# Patient Record
Sex: FEMALE | Race: WHITE | NOT HISPANIC OR LATINO | Employment: UNEMPLOYED | URBAN - METROPOLITAN AREA
[De-identification: names, ages, dates, MRNs, and addresses within clinical notes are randomized per-mention and may not be internally consistent; named-entity substitution may affect disease eponyms.]

---

## 2022-09-17 ENCOUNTER — APPOINTMENT (OUTPATIENT)
Dept: GENERAL RADIOLOGY | Facility: HOSPITAL | Age: 22
End: 2022-09-17

## 2022-09-17 PROCEDURE — 73560 X-RAY EXAM OF KNEE 1 OR 2: CPT | Performed by: FAMILY MEDICINE

## 2022-09-19 ENCOUNTER — OFFICE VISIT (OUTPATIENT)
Dept: ORTHOPEDIC SURGERY | Facility: CLINIC | Age: 22
End: 2022-09-19

## 2022-09-19 ENCOUNTER — TELEPHONE (OUTPATIENT)
Dept: ORTHOPEDIC SURGERY | Facility: CLINIC | Age: 22
End: 2022-09-19

## 2022-09-19 VITALS
SYSTOLIC BLOOD PRESSURE: 120 MMHG | DIASTOLIC BLOOD PRESSURE: 82 MMHG | WEIGHT: 110 LBS | BODY MASS INDEX: 17.68 KG/M2 | HEART RATE: 70 BPM | HEIGHT: 66 IN

## 2022-09-19 DIAGNOSIS — M25.461 EFFUSION OF RIGHT KNEE: ICD-10-CM

## 2022-09-19 DIAGNOSIS — M22.2X1 PATELLOFEMORAL PAIN SYNDROME OF RIGHT KNEE: Primary | ICD-10-CM

## 2022-09-19 PROCEDURE — 99203 OFFICE O/P NEW LOW 30 MIN: CPT | Performed by: ORTHOPAEDIC SURGERY

## 2022-09-19 NOTE — TELEPHONE ENCOUNTER
Patient calling stating she called to set up her MRI and she was told it would be 3 weeks.    She is wondering if she needs to do anything in the mean time for her knee.    DX:    1. Patellofemoral pain syndrome of right knee    2. Effusion of right knee

## 2022-09-19 NOTE — PROGRESS NOTES
"Subjective:     Patient ID: Matilde Martini is a 22 y.o. female.    Chief Complaint:  Right knee pain, new patient  History of Present Illness  Matilde Martini presents to clinic today for evaluation of right knee pain.    The patient states that she injured her knee on 09/16/2022. She fell while she was running on a trail. Since that point in time, she has noted mild pain to the right knee. She rates it as a 3 out of 10 majority of the time, but with deep pressure, it will get up to 8 to 9 out of 10. She notes her pain is aching in nature with bruising, stiffness, clicking, and popping noted. The pain is worse with driving, walking, running, and climbing stairs. She notes mild improvement with rest. The pain can be sharp in intensity. She does note minimal improvement with activity modification and mild improvement since the time of her injury. She does note increased pain as well as clicking and grinding, particularly on deep flexion activities. She denies associated numbness or tingling. She did get a bruise over the anterior aspect of her knee with a fall.    The patient is a . She denies any prior issues with her right knee. She is not currently taking any pain medications. She has been prescribed Aleve in the past but \"I didn't pick it up\". She visited urgent care and her right knee was placed in an Ace wrap. She does not currently have a knee sleeve.      Social History     Occupational History   • Not on file   Tobacco Use   • Smoking status: Never Smoker   • Smokeless tobacco: Never Used   Vaping Use   • Vaping Use: Never used   Substance and Sexual Activity   • Alcohol use: Never   • Drug use: Never   • Sexual activity: Defer      History reviewed. No pertinent past medical history.  History reviewed. No pertinent surgical history.    Family History   Problem Relation Age of Onset   • No Known Problems Mother    • No Known Problems Father          Review of Systems    " "    Objective:  Vitals:    09/19/22 1424   BP: 120/82   Pulse: 70   Weight: 49.9 kg (110 lb)   Height: 167.6 cm (66\")         09/19/22  1424   Weight: 49.9 kg (110 lb)     Body mass index is 17.75 kg/m².  Physical Exam    Vital signs reviewed.   General: No acute distress, alert and oriented  Eyes: conjunctiva clear; pupils equally round and reactive  ENT: external ears and nose atraumatic; oropharynx clear  CV: no peripheral edema  Resp: normal respiratory effort  Skin: no rashes or wounds; normal turgor  Psych: mood and affect appropriate; recent and remote memory intact          Ortho Exam     Right knee  ROM 0 to 95 degrees  Passive 0 to 130 degrees  Maximal tenderness to palpation along the anterior patella in a longitudinal position.  Ecchymosis and skin abrasion noted over the anterior aspect of the knee  No joint line pain  Tamela exam-Negative  Effusion-Moderate  Grade 1A Lachman  Anterior drawer-Negative  Posterior drawer-Negative  Stable opening on varus and valgus stress at 0 and 30  Log roll-Negative  Stinchfield-Negative    Imaging:  .TWO-VIEW RIGHT KNEE     HISTORY: Fell. Pain.     FINDINGS: The joint space is well-maintained and there is no fracture or  joint effusion.     This report was finalized on 9/17/2022 10:07 AM by Dr. Bud Garcia,    Reviewed outside x-rays right knee including review of imaging as well as radiology report indicate no evidence of dislocation or subluxation, no discrete evidence of fracture though there does appear to be some evidence of radiolucency extending from superior to inferior patella, no evidence of any displacement, no findings consistent on lateral x-ray but I cannot completely rule out a longitudinal fracture      Assessment:        1. Patellofemoral pain syndrome of right knee    2. Effusion of right knee           Plan:          1. Discussed treatment options at length with patient at today's visit. At this point in time, I would recommend to get an MRI to " evaluate for osteochondral lesion given her swelling and significant pain as well as to rule out a longitudinal fracture of her patella, which I can not rule out from her x-rays alone. I am also concerned about this given her effusion noted today.  2. I will follow up with her on her MRI over the phone and we will discuss further treatment options at that time. I offered her anti-inflammatory medications, but she has declined at this point.       Matilde Martini was in agreement with plan and had all questions answered.     Orders:  Orders Placed This Encounter   Procedures   • MRI Knee Right Without Contrast       Medications:  No orders of the defined types were placed in this encounter.      Followup:  Return for review of MRI results.    Diagnoses and all orders for this visit:    1. Patellofemoral pain syndrome of right knee (Primary)  -     MRI Knee Right Without Contrast; Future    2. Effusion of right knee  -     MRI Knee Right Without Contrast; Future          Dictated utilizing Dragon dictation     Transcribed from ambient dictation for Mau Love MD by JOEY DONNELLY.  09/19/22   16:45 EDT    Patient verbalized consent to the visit recording.  I have personally performed the services described in this document as transcribed by the above individual, and it is both accurate and complete.  Mau Love MD  9/21/2022  22:13 EDT

## 2022-09-21 ENCOUNTER — HOSPITAL ENCOUNTER (OUTPATIENT)
Dept: MRI IMAGING | Facility: HOSPITAL | Age: 22
Discharge: HOME | End: 2022-09-21
Admitting: ORTHOPAEDIC SURGERY

## 2022-09-21 DIAGNOSIS — M22.2X1 PATELLOFEMORAL PAIN SYNDROME OF RIGHT KNEE: ICD-10-CM

## 2022-09-21 DIAGNOSIS — M25.461 EFFUSION OF RIGHT KNEE: ICD-10-CM

## 2022-09-21 PROCEDURE — 73721 MRI JNT OF LWR EXTRE W/O DYE: CPT

## 2022-09-27 ENCOUNTER — TELEPHONE (OUTPATIENT)
Dept: ORTHOPEDIC SURGERY | Facility: CLINIC | Age: 22
End: 2022-09-27

## 2022-09-27 NOTE — TELEPHONE ENCOUNTER
----- Message from Mau Love MD sent at 9/27/2022  3:47 PM EDT -----  Please call the patient and let her know that her MRI looks fine with no meniscus or cartilage issues.  She can advance with activities as tolerated based on her pain

## 2023-07-10 PROBLEM — S93.402A MILD SPRAIN OF LEFT ANKLE: Status: ACTIVE | Noted: 2023-07-10

## 2023-11-07 ENCOUNTER — TELEPHONE (OUTPATIENT)
Dept: FAMILY MEDICINE CLINIC | Facility: CLINIC | Age: 23
End: 2023-11-07

## 2023-11-07 NOTE — TELEPHONE ENCOUNTER
Caller: Matilde Martini    Relationship: Self    Best call back number: [phone]     What was the call regarding: PATIENT RETURNED CALL TO SCHEDULE. HUB UNABLE TO SCHEDULE WITH ANOTHER DOCTOR AND PATIENT HAD TO RETURN TO WORK. PLEASE CALL TO SCHEDULE

## 2023-11-07 NOTE — TELEPHONE ENCOUNTER
Caller: Matilde Martini    Relationship to patient: Self    Best call back number: [phone]     Type of visit: PHYSICAL    Requested date: ASAP     Additional notes: NO OPENINGS UNTIL APRIL, PLEASE ADVISE IF PATIENT CAN BE SEEN SOONER.      PATIENT DID ADVISE SHE HAS NEW INSURANCE AND IS NO LONGER WITH [employer].

## 2023-11-10 ENCOUNTER — OFFICE VISIT (OUTPATIENT)
Dept: FAMILY MEDICINE CLINIC | Facility: CLINIC | Age: 23
End: 2023-11-10
Payer: COMMERCIAL

## 2023-11-10 ENCOUNTER — LAB (OUTPATIENT)
Dept: LAB | Facility: HOSPITAL | Age: 23
End: 2023-11-10
Payer: COMMERCIAL

## 2023-11-10 VITALS
HEIGHT: 66 IN | DIASTOLIC BLOOD PRESSURE: 73 MMHG | SYSTOLIC BLOOD PRESSURE: 110 MMHG | OXYGEN SATURATION: 98 % | HEART RATE: 68 BPM | WEIGHT: 120 LBS | BODY MASS INDEX: 19.29 KG/M2

## 2023-11-10 DIAGNOSIS — R55 SYNCOPE, UNSPECIFIED SYNCOPE TYPE: Primary | ICD-10-CM

## 2023-11-10 DIAGNOSIS — R42 DIZZINESS: ICD-10-CM

## 2023-11-10 DIAGNOSIS — R55 SYNCOPE, UNSPECIFIED SYNCOPE TYPE: ICD-10-CM

## 2023-11-10 PROBLEM — S93.402A MILD SPRAIN OF LEFT ANKLE: Status: RESOLVED | Noted: 2023-07-10 | Resolved: 2023-11-10

## 2023-11-10 LAB
ALBUMIN SERPL-MCNC: 5 G/DL (ref 3.5–5.2)
ALBUMIN/GLOB SERPL: 1.7 G/DL
ALP SERPL-CCNC: 83 U/L (ref 39–117)
ALT SERPL W P-5'-P-CCNC: 10 U/L (ref 1–33)
ANION GAP SERPL CALCULATED.3IONS-SCNC: 11.2 MMOL/L (ref 5–15)
AST SERPL-CCNC: 19 U/L (ref 1–32)
BASOPHILS # BLD AUTO: 0.07 10*3/MM3 (ref 0–0.2)
BASOPHILS NFR BLD AUTO: 0.7 % (ref 0–1.5)
BILIRUB SERPL-MCNC: 0.3 MG/DL (ref 0–1.2)
BUN SERPL-MCNC: 9 MG/DL (ref 6–20)
BUN/CREAT SERPL: 16.1 (ref 7–25)
CALCIUM SPEC-SCNC: 10 MG/DL (ref 8.6–10.5)
CHLORIDE SERPL-SCNC: 98 MMOL/L (ref 98–107)
CO2 SERPL-SCNC: 27.8 MMOL/L (ref 22–29)
CREAT SERPL-MCNC: 0.56 MG/DL (ref 0.57–1)
DEPRECATED RDW RBC AUTO: 40.1 FL (ref 37–54)
EGFRCR SERPLBLD CKD-EPI 2021: 133.4 ML/MIN/1.73
EOSINOPHIL # BLD AUTO: 0.07 10*3/MM3 (ref 0–0.4)
EOSINOPHIL NFR BLD AUTO: 0.7 % (ref 0.3–6.2)
ERYTHROCYTE [DISTWIDTH] IN BLOOD BY AUTOMATED COUNT: 12.1 % (ref 12.3–15.4)
GLOBULIN UR ELPH-MCNC: 2.9 GM/DL
GLUCOSE SERPL-MCNC: 96 MG/DL (ref 65–99)
HCT VFR BLD AUTO: 41.6 % (ref 34–46.6)
HGB BLD-MCNC: 14.2 G/DL (ref 12–15.9)
IMM GRANULOCYTES # BLD AUTO: 0.04 10*3/MM3 (ref 0–0.05)
IMM GRANULOCYTES NFR BLD AUTO: 0.4 % (ref 0–0.5)
LYMPHOCYTES # BLD AUTO: 3.61 10*3/MM3 (ref 0.7–3.1)
LYMPHOCYTES NFR BLD AUTO: 33.6 % (ref 19.6–45.3)
MCH RBC QN AUTO: 30.9 PG (ref 26.6–33)
MCHC RBC AUTO-ENTMCNC: 34.1 G/DL (ref 31.5–35.7)
MCV RBC AUTO: 90.6 FL (ref 79–97)
MONOCYTES # BLD AUTO: 0.72 10*3/MM3 (ref 0.1–0.9)
MONOCYTES NFR BLD AUTO: 6.7 % (ref 5–12)
NEUTROPHILS NFR BLD AUTO: 57.9 % (ref 42.7–76)
NEUTROPHILS NFR BLD AUTO: 6.25 10*3/MM3 (ref 1.7–7)
NRBC BLD AUTO-RTO: 0 /100 WBC (ref 0–0.2)
PLATELET # BLD AUTO: 240 10*3/MM3 (ref 140–450)
PMV BLD AUTO: 9.9 FL (ref 6–12)
POTASSIUM SERPL-SCNC: 4.2 MMOL/L (ref 3.5–5.2)
PROT SERPL-MCNC: 7.9 G/DL (ref 6–8.5)
RBC # BLD AUTO: 4.59 10*6/MM3 (ref 3.77–5.28)
SODIUM SERPL-SCNC: 137 MMOL/L (ref 136–145)
TSH SERPL DL<=0.05 MIU/L-ACNC: 2.1 UIU/ML (ref 0.27–4.2)
WBC NRBC COR # BLD: 10.76 10*3/MM3 (ref 3.4–10.8)

## 2023-11-10 PROCEDURE — 36415 COLL VENOUS BLD VENIPUNCTURE: CPT

## 2023-11-10 PROCEDURE — 80050 GENERAL HEALTH PANEL: CPT

## 2023-11-10 NOTE — PROGRESS NOTES
"Chief Complaint  Chief Complaint   Patient presents with    Blurred Vision     2 days, eyes wouldn't focus on anything       Subjective    History of Present Illness  Matilde Martini is a 23 y.o. female presents to [facility] for 2 days of blurry vision and difficulty focusing that self resolved a week ago.     The day symptoms started she woke up at 5am for school, no symptoms at that time. She felt groggy and kind of \"out of it\", but wasn't sure if that was normal morning for her. She went to class and had to sprint at maximal effort on a stationary bike for 30 seconds, she felt dizzy and short of breath when she got off the bike, started walking down the castillo- maybe 20 m- and felt dizzy, tunnel vision, slid down the wall. Did not hit her head, no total loss of consciousness, dizziness improved when she was seated, lasted several minutes, but did not fully resolve. She missed her class. She felt like she was out of it, \"in a fish bowl\", difficulty focusing for 2 full days. She did not feel safe driving. The first day she took it easy and sat at home. The second day she tried to push through the symptoms and went on an outdoor bike and after a few hours she realized she did not remember the previous 10 turns/5 miles. Her mom came to get her and take her home and she sat at home. The third day she felt groggy again when she woke up, had trouble focusing eyes but it slowly improved throughout the day and was able to do homework by the end of it. On the fourth day she had no symptoms.     Prior to symptoms starting she did not have any head trauma or eye trauma. While she was symptomatic she increased food intake, drank water with salt and fruit juice in it. She slept her normal amount, her watch did not note any sleep abnormalities. No increase in anxiety/depression.irritability while symptomatic, felt \"dissociative\" and like she physically could not exercise, which is unusual for her. " "There was no nausea/vomiting while symptomatic.     She exercises 1-4 hours a day 7 days a week- she does primarily cycling with little strength training. She is a professional cyclist. Her body fat is 12%.  Her last period was 2 weeks ago, she does have regular periods.    She has had exercise induced syncope in high school when she was not eating adequate calories and was significantly underweight. No family history of similar events.       Objective   Vitals:    11/10/23 1143   BP: 110/73   BP Location: Left arm   Patient Position: Sitting   Cuff Size: Adult   Pulse: 68   SpO2: 98%   Weight: 54.4 kg (120 lb)   Height: 167.6 cm (65.98\")        BMI is within normal parameters. No other follow-up for BMI required.       Physical Exam  Vitals reviewed.   Constitutional:       General: She is not in acute distress.     Appearance: Normal appearance.   HENT:      Head: Normocephalic and atraumatic.      Right Ear: Tympanic membrane, ear canal and external ear normal.      Left Ear: Tympanic membrane, ear canal and external ear normal.      Nose: Nose normal. No rhinorrhea.      Mouth/Throat:      Mouth: Mucous membranes are moist.      Pharynx: No posterior oropharyngeal erythema.   Eyes:      Extraocular Movements: Extraocular movements intact.      Conjunctiva/sclera: Conjunctivae normal.      Pupils: Pupils are equal, round, and reactive to light.   Neck:      Comments: No thyromegaly or thyroid nodule on palpation  Cardiovascular:      Rate and Rhythm: Normal rate and regular rhythm.      Pulses: Normal pulses.      Heart sounds: Normal heart sounds. No murmur heard.  Pulmonary:      Effort: Pulmonary effort is normal.      Breath sounds: Normal breath sounds. No wheezing.   Abdominal:      General: Bowel sounds are normal. There is no distension.      Palpations: Abdomen is soft.      Tenderness: There is no abdominal tenderness.   Musculoskeletal:         General: No tenderness or deformity. Normal range of " motion.   Skin:     General: Skin is warm and dry.      Capillary Refill: Capillary refill takes less than 2 seconds.      Findings: No lesion or rash.      Comments: Healed slightly hypertrophic scar left posterior elbow   Neurological:      Mental Status: She is alert and oriented to person, place, and time.      Cranial Nerves: Cranial nerves 2-12 are intact.      Sensory: Sensation is intact. No sensory deficit (Normal joint position sense and vibratory sensation).      Motor: Motor function is intact. No weakness, tremor or abnormal muscle tone.      Coordination: Coordination normal. Finger-Nose-Finger Test and Heel to Shin Test normal.      Gait: Gait and tandem walk normal.      Deep Tendon Reflexes:      Reflex Scores:       Tricep reflexes are 2+ on the right side and 2+ on the left side.       Bicep reflexes are 2+ on the right side and 2+ on the left side.       Brachioradialis reflexes are 2+ on the right side and 2+ on the left side.       Patellar reflexes are 2+ on the right side and 2+ on the left side.     Comments: Patient with slight wobbling during Romberg, reports onset of dizziness with Romberg.  Normal head impulse test.  [place]-Hallpike no dizziness or nystagmus with head down, however patient experienced dizziness upon sitting up after [place]-Hallpike.   Psychiatric:         Mood and Affect: Mood normal.         Behavior: Behavior normal.         Judgment: Judgment normal.        Vitals:    11/10/23 1143 11/10/23 1259 11/10/23 1300 11/10/23 1301   Orthostatic BP:  106/70 106/68 98/68   Patient Position: Sitting Sitting Lying Lying         ECG 12 Lead    Date/Time: 11/10/2023 12:22 PM  Performed by: Maggy Vázquez MD    Authorized by: Maggy Vázquez MD  Previous ECG: no previous ECG available  Rhythm: sinus rhythm  Rate: normal  BPM: 62  Conduction: conduction normal  QRS axis: normal (53)    Clinical impression: non-specific ECG  Comments: Poss notched P in lead I, aVL; poss notched QRS aVL; poss  delta wave aVF        Assessment and Plan    Matilde Martini is a 23 y.o. female presents to [facility] with 4 days of dizziness, mental grogginess, and an episode of exercise-induced syncope.  She has had syncopal episodes previously related to caloric deficiency and underweight.  Orthostatics in clinic were reassuring, she did experience dizziness with Romberg and after [place]-Hallpike.  EKG was borderline.  Suspect patient's symptoms may have been due to to inadequate caloric intake versus dehydration, however based on history, exam, and EKG cannot rule out neurologic versus cardiovascular etiology.  - Imaging: MRI brain with and without  - Labs: CBC, CMP, TSH  - referral to cardiology for evaluation, would like recommendations on stress test versus ambulatory monitoring for arrhythmia  -Provided strict activity modification guidelines for patient, counseled on risk of serious injury and/or death if recommendations are not followed.  Will discuss with patient's  if needed.  - pt directed to ER if symptoms recur    Diagnoses and all orders for this visit:    1. Syncope, unspecified syncope type (Primary)  -     ECG 12 Lead  -     CBC & Differential; Future  -     Comprehensive Metabolic Panel; Future  -     TSH Rfx On Abnormal To Free T4; Future  -     Cancel: POC Pregnancy, Urine  -     MRI Brain With & Without Contrast; Future  -     Ambulatory Referral to Cardiology    2. Dizziness  -     MRI Brain With & Without Contrast; Future  -     Ambulatory Referral to Cardiology        Patient voiced understanding and agreement with plan of care and had no further questions or concerns at this time.     I spent 90 minutes caring for Matilde Martini on this date of service. This time includes time spent by me in the following activities: preparing for the visit, reviewing tests, obtaining and/or reviewing a separately obtained history, performing a medically appropriate examination and/or  evaluation, counseling and educating the patient/family/caregiver, ordering medications, tests, or procedures, documenting information in the medical record, and independently interpreting results and communicating that information with the patient/family/caregiver. I spent 5 minutes on the separately reported service of EKG. This time is not included in the time used to support the E/M service also reported today.       Maggy Vázquez MD  Family Medicine  [facility]    Follow Up  Return if symptoms worsen or fail to improve.    Patient Instructions   Today: Labs- [facility]. [address] [place] [address].     Meds: none    Referrals: cardiology    Imaging: MRI brain    Other advice:  - take 1 rest day a week with no exercise at all  - decrease duration/intensity of cardiac exercise  - add in low impact strength training 2-3 days a week  - increase intake of protein and healthy fats- avocado, butter, coconut oil, olive oil, etc.   - ensure adequate hydration and electrolyte/salt intake  - listen to your body- do not exercise to the point of nausea/vomiting until evaluated by cardiology  - do not partake in extended outdoor rides unaccompanied until evaluated by cardiology- risk of fall, head injury, being hit by a car, etc.   - if symptoms reoccur, go to ER.

## 2023-11-10 NOTE — PATIENT INSTRUCTIONS
Today: Labs- [facility]. [address] [place] [address].     Meds: none    Referrals: cardiology    Imaging: MRI brain    Other advice:  - take 1 rest day a week with no exercise at all  - decrease duration/intensity of cardiac exercise  - add in low impact strength training 2-3 days a week  - increase intake of protein and healthy fats- avocado, butter, coconut oil, olive oil, etc.   - ensure adequate hydration and electrolyte/salt intake  - listen to your body- do not exercise to the point of nausea/vomiting until evaluated by cardiology  - do not partake in extended outdoor rides unaccompanied until evaluated by cardiology- risk of fall, head injury, being hit by a car, etc.   - if symptoms reoccur, go to ER.

## 2023-11-10 NOTE — LETTER
November 10, 2023     Patient: Matilde Martini   YOB: 2000   Date of Visit: 11/10/2023       To Whom It May Concern:    Matilde Martini was seen in clinic today. It is my medical opinion that Matilde Martini may return to work tomorrow. .         Sincerely,        Maggy Vázquez MD    CC: No Recipients

## 2023-11-13 DIAGNOSIS — D72.820 LYMPHOCYTOSIS: Primary | ICD-10-CM

## 2023-11-13 NOTE — PROGRESS NOTES
Hello!    Here are the results of your most recent labs:    Your Comprehensive Metabolic Panel and TSH was all normal.     Your CBC was abnormal.  Your lymphocytes, the white blood cells that commonly are associated with viral infections, were slightly elevated.  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  Given that you are a , the one that concerns me the most would be potentially mononucleosis.  We have a couple options: we can continue to watch and wait, or we could repeat the lab in 1 week and add some mono antibodies to see if you are or have recently been infected.  Please let me know what you would like to do and I will order accordingly.    Please continue your current medications. Please contact me with any questions.    Thank you!  Dr. Vázquez

## 2023-11-15 ENCOUNTER — LAB (OUTPATIENT)
Dept: LAB | Facility: HOSPITAL | Age: 23
End: 2023-11-15
Payer: COMMERCIAL

## 2023-11-15 DIAGNOSIS — D72.820 LYMPHOCYTOSIS: ICD-10-CM

## 2023-11-15 LAB
DEPRECATED RDW RBC AUTO: 40.5 FL (ref 37–54)
ERYTHROCYTE [DISTWIDTH] IN BLOOD BY AUTOMATED COUNT: 12.1 % (ref 12.3–15.4)
HCT VFR BLD AUTO: 42.7 % (ref 34–46.6)
HGB BLD-MCNC: 14.3 G/DL (ref 12–15.9)
LYMPHOCYTES # BLD MANUAL: 3.81 10*3/MM3 (ref 0.7–3.1)
LYMPHOCYTES NFR BLD MANUAL: 10 % (ref 5–12)
MCH RBC QN AUTO: 30.6 PG (ref 26.6–33)
MCHC RBC AUTO-ENTMCNC: 33.5 G/DL (ref 31.5–35.7)
MCV RBC AUTO: 91.4 FL (ref 79–97)
MONOCYTES # BLD: 0.79 10*3/MM3 (ref 0.1–0.9)
NEUTROPHILS # BLD AUTO: 3.33 10*3/MM3 (ref 1.7–7)
NEUTROPHILS NFR BLD MANUAL: 42 % (ref 42.7–76)
PLAT MORPH BLD: NORMAL
PLATELET # BLD AUTO: 224 10*3/MM3 (ref 140–450)
PMV BLD AUTO: 9.8 FL (ref 6–12)
RBC # BLD AUTO: 4.67 10*6/MM3 (ref 3.77–5.28)
RBC MORPH BLD: NORMAL
VARIANT LYMPHS NFR BLD MANUAL: 48 % (ref 19.6–45.3)
WBC MORPH BLD: NORMAL
WBC NRBC COR # BLD: 7.94 10*3/MM3 (ref 3.4–10.8)

## 2023-11-15 PROCEDURE — 86664 EPSTEIN-BARR NUCLEAR ANTIGEN: CPT

## 2023-11-15 PROCEDURE — 85027 COMPLETE CBC AUTOMATED: CPT

## 2023-11-15 PROCEDURE — 36415 COLL VENOUS BLD VENIPUNCTURE: CPT

## 2023-11-15 PROCEDURE — 86665 EPSTEIN-BARR CAPSID VCA: CPT

## 2023-11-15 PROCEDURE — 85007 BL SMEAR W/DIFF WBC COUNT: CPT

## 2023-11-15 PROCEDURE — 86645 CMV ANTIBODY IGM: CPT

## 2023-11-15 PROCEDURE — 86644 CMV ANTIBODY: CPT

## 2023-11-16 LAB
CMV IGG SERPL IA-ACNC: <0.6 U/ML (ref 0–0.59)
CMV IGM SERPL IA-ACNC: <30 AU/ML (ref 0–29.9)
EBV NA IGG SER IA-ACNC: <18 U/ML (ref 0–17.9)
EBV VCA IGG SER IA-ACNC: <18 U/ML (ref 0–17.9)
EBV VCA IGM SER IA-ACNC: <36 U/ML (ref 0–35.9)
SERVICE CMNT-IMP: NORMAL

## 2023-11-16 NOTE — PROGRESS NOTES
Hello!    Good news- your mono labs are negative... but your lymphocyte counts are not improved. So there is likely another virus that your immune system is fighting. How are you feeling the past few days?    Thanks!  Dr. Vázquez

## 2023-11-27 ENCOUNTER — OFFICE VISIT (OUTPATIENT)
Dept: CARDIOLOGY | Facility: CLINIC | Age: 23
End: 2023-11-27
Payer: COMMERCIAL

## 2023-11-27 VITALS
HEART RATE: 72 BPM | OXYGEN SATURATION: 99 % | WEIGHT: 119 LBS | SYSTOLIC BLOOD PRESSURE: 112 MMHG | HEIGHT: 66 IN | DIASTOLIC BLOOD PRESSURE: 74 MMHG | BODY MASS INDEX: 19.13 KG/M2

## 2023-11-27 DIAGNOSIS — R55 SYNCOPE AND COLLAPSE: Primary | ICD-10-CM

## 2023-11-27 PROCEDURE — 99204 OFFICE O/P NEW MOD 45 MIN: CPT | Performed by: INTERNAL MEDICINE

## 2023-11-27 NOTE — PROGRESS NOTES
PATIENTINFORMATION    Date of Office Visit: 2023  Encounter Provider: Jefferson Love MD  Place of Service: [facility] CARDIOLOGY  Patient Name: Matilde Martini  : 2000    Subjective:     Encounter Date:2023      Patient ID: Matilde Martini is a 23 y.o. female.    No chief complaint on file.    HPI  Ms. Martini is a 22 yo young lady who came to cardiology clinic for evaluation of abnormal EKG and syncopal spell she sustained following an exercise class at her school about 4 weeks ago.  She passed out after sprint biking that she did for about 23 seconds.  She got tired, got off  bike and walked, felt lightheaded and passed out.  She thinks she had palpitations during episode.  He has done this several times but never had similar symptoms.  She has been feeling increasingly tired since that event.  No prior syncopal spell.  She denies any rest or exertional chest pain, shortness of breath, orthopnea, PND or extremity swelling.  No family history of sudden cardiac death or significant heart disease.  Currently not on any prescription medication.        ROS  All systems reviewed and negative except as noted in HPI.    Past Medical History:   Diagnosis Date    Abnormal ECG     Asthma     In middle and high school    Mild sprain of left ankle        History reviewed. No pertinent surgical history.    Social History     Socioeconomic History    Marital status: Single   Tobacco Use    Smoking status: Never     Passive exposure: Never    Smokeless tobacco: Never   Vaping Use    Vaping Use: Never used   Substance and Sexual Activity    Alcohol use: Yes     Comment: occasionally    Drug use: Never    Sexual activity: Yes     Partners: Male     Birth control/protection: Condom       Family History   Problem Relation Age of Onset    No Known Problems Mother     No Known Problems Father     No Known Problems Sister     No Known Problems Brother     No Known Problems Maternal Aunt     No  "Known Problems Maternal Uncle     No Known Problems Paternal Aunt     No Known Problems Paternal Uncle     No Known Problems Maternal Grandmother     No Known Problems Maternal Grandfather     No Known Problems Paternal Grandmother     No Known Problems Paternal Grandfather     No Known Problems Other          Procedures       Objective:     /74 (BP Location: Right arm, Patient Position: Sitting, Cuff Size: Adult)   Pulse 72   Ht 167.6 cm (66\")   Wt 54 kg (119 lb)   LMP 10/20/2023 (Approximate)   SpO2 99%   BMI 19.21 kg/m²  Body mass index is 19.21 kg/m².     Constitutional:       General: Not in acute distress.     Appearance: Well-developed. Not diaphoretic.   Eyes:      Pupils: Pupils are equal, round, and reactive to light.   HENT:      Head: Normocephalic and atraumatic.   Neck:      Thyroid: No thyromegaly.   Pulmonary:      Effort: Pulmonary effort is normal. No respiratory distress.      Breath sounds: Normal breath sounds. No wheezing. No rales.   Chest:      Chest wall: Not tender to palpatation.   Cardiovascular:      Normal rate. Regular rhythm.      No gallop.    Pulses:     Intact distal pulses.   Edema:     Peripheral edema absent.   Abdominal:      General: Bowel sounds are normal. There is no distension.      Palpations: Abdomen is soft.      Tenderness: There is no guarding.   Musculoskeletal: Normal range of motion.         General: No deformity.      Cervical back: Normal range of motion and neck supple. Skin:     General: Skin is warm and dry.      Findings: No rash.   Neurological:      Mental Status: Alert and oriented to person, place, and time.      Cranial Nerves: No cranial nerve deficit.      Deep Tendon Reflexes: Reflexes are normal and symmetric.   Psychiatric:         Judgment: Judgment normal.         Review Of Data: I have reviewed pertinent recent labs, images and documents and pertinent findings included in HPI or assessment below.          Assessment/Plan:       "   Syncopal spell following an exercise with prodromal symptoms.  No prior episodes.  I have reviewed EKG done during office visit with her PCP which is normal.  Reports increased fatigue since after syncopal spell.  Incidentally noted to have mild lymphocytosis.  She could be recovering from unspecified viral illness.    Due to stress echocardiogram to rule out any structural heart disease, exercise-induced arrhythmia    Diagnosis and plan of care discussed with patient and verbalized understanding.            Your medication list      as of November 27, 2023  4:27 PM     You have not been prescribed any medications.             Jefferson Love MD  11/27/23  16:27 EST

## 2023-11-28 DIAGNOSIS — F40.240 CLAUSTROPHOBIA: Primary | ICD-10-CM

## 2023-11-28 RX ORDER — LORAZEPAM 1 MG/1
1 TABLET ORAL AS NEEDED
Qty: 2 TABLET | Refills: 0 | Status: SHIPPED | OUTPATIENT
Start: 2023-11-28

## 2023-12-05 ENCOUNTER — HOSPITAL ENCOUNTER (OUTPATIENT)
Dept: CARDIOLOGY | Facility: HOSPITAL | Age: 23
Discharge: HOME | End: 2023-12-05
Admitting: INTERNAL MEDICINE
Payer: COMMERCIAL

## 2023-12-05 VITALS
BODY MASS INDEX: 19.13 KG/M2 | WEIGHT: 119 LBS | HEIGHT: 66 IN | DIASTOLIC BLOOD PRESSURE: 84 MMHG | SYSTOLIC BLOOD PRESSURE: 124 MMHG | HEART RATE: 72 BPM | OXYGEN SATURATION: 98 %

## 2023-12-05 DIAGNOSIS — R55 SYNCOPE AND COLLAPSE: ICD-10-CM

## 2023-12-05 LAB
AORTIC ARCH: 2.5 CM
ASCENDING AORTA: 2.1 CM
BH CV ECHO MEAS - ACS: 2.1 CM
BH CV ECHO MEAS - AO MAX PG: 10.5 MMHG
BH CV ECHO MEAS - AO MEAN PG: 6 MMHG
BH CV ECHO MEAS - AO ROOT DIAM: 2.7 CM
BH CV ECHO MEAS - AO V2 MAX: 162 CM/SEC
BH CV ECHO MEAS - AO V2 VTI: 33.2 CM
BH CV ECHO MEAS - AVA(I,D): 1.7 CM2
BH CV ECHO MEAS - EDV(CUBED): 103.8 ML
BH CV ECHO MEAS - EDV(MOD-SP4): 93 ML
BH CV ECHO MEAS - EF(MOD-BP): 60 %
BH CV ECHO MEAS - EF(MOD-SP4): 60.2 %
BH CV ECHO MEAS - ESV(CUBED): 29.2 ML
BH CV ECHO MEAS - ESV(MOD-SP4): 37 ML
BH CV ECHO MEAS - FS: 34.5 %
BH CV ECHO MEAS - IVS/LVPW: 0.88 CM
BH CV ECHO MEAS - IVSD: 0.7 CM
BH CV ECHO MEAS - LAT PEAK E' VEL: 18.1 CM/SEC
BH CV ECHO MEAS - LV DIASTOLIC VOL/BSA (35-75): 58 CM2
BH CV ECHO MEAS - LV MASS(C)D: 112.5 GRAMS
BH CV ECHO MEAS - LV MAX PG: 4 MMHG
BH CV ECHO MEAS - LV MEAN PG: 2 MMHG
BH CV ECHO MEAS - LV SYSTOLIC VOL/BSA (12-30): 23.1 CM2
BH CV ECHO MEAS - LV V1 MAX: 100 CM/SEC
BH CV ECHO MEAS - LV V1 VTI: 19.9 CM
BH CV ECHO MEAS - LVIDD: 4.7 CM
BH CV ECHO MEAS - LVIDS: 3.1 CM
BH CV ECHO MEAS - LVOT AREA: 2.8 CM2
BH CV ECHO MEAS - LVOT DIAM: 1.9 CM
BH CV ECHO MEAS - LVPWD: 0.8 CM
BH CV ECHO MEAS - MED PEAK E' VEL: 13.5 CM/SEC
BH CV ECHO MEAS - MR MAX PG: 76.4 MMHG
BH CV ECHO MEAS - MR MAX VEL: 437.1 CM/SEC
BH CV ECHO MEAS - MV A DUR: 0.12 SEC
BH CV ECHO MEAS - MV A MAX VEL: 63.8 CM/SEC
BH CV ECHO MEAS - MV DEC SLOPE: 412.2 CM/SEC2
BH CV ECHO MEAS - MV DEC TIME: 0.2 SEC
BH CV ECHO MEAS - MV E MAX VEL: 104 CM/SEC
BH CV ECHO MEAS - MV E/A: 1.63
BH CV ECHO MEAS - MV MAX PG: 6.1 MMHG
BH CV ECHO MEAS - MV MEAN PG: 2.7 MMHG
BH CV ECHO MEAS - MV P1/2T: 84.9 MSEC
BH CV ECHO MEAS - MV V2 VTI: 34.2 CM
BH CV ECHO MEAS - MVA(P1/2T): 2.6 CM2
BH CV ECHO MEAS - MVA(VTI): 1.65 CM2
BH CV ECHO MEAS - PA ACC TIME: 0.17 SEC
BH CV ECHO MEAS - PA V2 MAX: 137.1 CM/SEC
BH CV ECHO MEAS - PI END-D VEL: 100.4 CM/SEC
BH CV ECHO MEAS - PULM A REVS DUR: 0.13 SEC
BH CV ECHO MEAS - PULM A REVS VEL: 21.9 CM/SEC
BH CV ECHO MEAS - PULM DIAS VEL: 53.1 CM/SEC
BH CV ECHO MEAS - PULM S/D: 0.96
BH CV ECHO MEAS - PULM SYS VEL: 51 CM/SEC
BH CV ECHO MEAS - QP/QS: 1.02
BH CV ECHO MEAS - RAP SYSTOLE: 3 MMHG
BH CV ECHO MEAS - RV MAX PG: 2.7 MMHG
BH CV ECHO MEAS - RV V1 MAX: 81.4 CM/SEC
BH CV ECHO MEAS - RV V1 VTI: 18.1 CM
BH CV ECHO MEAS - RVOT DIAM: 2.02 CM
BH CV ECHO MEAS - RVSP: 27.7 MMHG
BH CV ECHO MEAS - SI(MOD-SP4): 34.9 ML/M2
BH CV ECHO MEAS - SUP REN AO DIAM: 1.3 CM
BH CV ECHO MEAS - SV(LVOT): 56.5 ML
BH CV ECHO MEAS - SV(MOD-SP4): 56 ML
BH CV ECHO MEAS - SV(RVOT): 57.8 ML
BH CV ECHO MEAS - TAPSE (>1.6): 2.9 CM
BH CV ECHO MEAS - TR MAX PG: 24.7 MMHG
BH CV ECHO MEAS - TR MAX VEL: 248.4 CM/SEC
BH CV ECHO MEASUREMENTS AVERAGE E/E' RATIO: 6.58
BH CV STRESS BP STAGE 1: NORMAL
BH CV STRESS BP STAGE 2: NORMAL
BH CV STRESS BP STAGE 3: NORMAL
BH CV STRESS DURATION MIN STAGE 1: 3
BH CV STRESS DURATION MIN STAGE 2: 3
BH CV STRESS DURATION MIN STAGE 3: 3
BH CV STRESS DURATION MIN STAGE 4: 1
BH CV STRESS DURATION SEC STAGE 1: 0
BH CV STRESS DURATION SEC STAGE 2: 0
BH CV STRESS DURATION SEC STAGE 3: 0
BH CV STRESS DURATION SEC STAGE 4: 0
BH CV STRESS ECHO POST STRESS EJECTION FRACTION EF: 72 %
BH CV STRESS GRADE STAGE 1: 10
BH CV STRESS GRADE STAGE 2: 12
BH CV STRESS GRADE STAGE 3: 14
BH CV STRESS GRADE STAGE 4: 16
BH CV STRESS HR STAGE 1: 124
BH CV STRESS HR STAGE 2: 158
BH CV STRESS HR STAGE 3: 179
BH CV STRESS HR STAGE 4: 188
BH CV STRESS METS STAGE 1: 5
BH CV STRESS METS STAGE 2: 7.5
BH CV STRESS METS STAGE 3: 10
BH CV STRESS METS STAGE 4: 11
BH CV STRESS PROTOCOL 1: NORMAL
BH CV STRESS SPEED STAGE 1: 1.7
BH CV STRESS SPEED STAGE 2: 2.5
BH CV STRESS SPEED STAGE 3: 3.4
BH CV STRESS SPEED STAGE 4: 4.2
BH CV STRESS STAGE 1: 1
BH CV STRESS STAGE 2: 2
BH CV STRESS STAGE 3: 3
BH CV STRESS STAGE 4: 4
BH CV XLRA - RV BASE: 2.7 CM
BH CV XLRA - RV LENGTH: 8 CM
BH CV XLRA - RV MID: 2.4 CM
BH CV XLRA - TDI S': 12.4 CM/SEC
LEFT ATRIUM VOLUME INDEX: 17.7 ML/M2
MAXIMAL PREDICTED HEART RATE: 199 BPM
PERCENT MAX PREDICTED HR: 94.47 %
SINUS: 2.3 CM
STJ: 2 CM
STRESS BASELINE BP: NORMAL MMHG
STRESS BASELINE HR: 77 BPM
STRESS PERCENT HR: 111 %
STRESS POST ESTIMATED WORKLOAD: 11 METS
STRESS POST EXERCISE DUR MIN: 10 MIN
STRESS POST EXERCISE DUR SEC: 0 SEC
STRESS POST PEAK BP: NORMAL MMHG
STRESS POST PEAK HR: 188 BPM
STRESS TARGET HR: 169 BPM

## 2023-12-05 PROCEDURE — 93325 DOPPLER ECHO COLOR FLOW MAPG: CPT

## 2023-12-05 PROCEDURE — 93320 DOPPLER ECHO COMPLETE: CPT

## 2023-12-05 PROCEDURE — 93350 STRESS TTE ONLY: CPT

## 2023-12-05 PROCEDURE — 93356 MYOCRD STRAIN IMG SPCKL TRCK: CPT

## 2023-12-05 PROCEDURE — 93017 CV STRESS TEST TRACING ONLY: CPT

## 2023-12-11 ENCOUNTER — HOSPITAL ENCOUNTER (OUTPATIENT)
Dept: MRI IMAGING | Facility: HOSPITAL | Age: 23
Discharge: HOME | End: 2023-12-11
Admitting: FAMILY MEDICINE
Payer: COMMERCIAL

## 2023-12-11 DIAGNOSIS — R55 SYNCOPE, UNSPECIFIED SYNCOPE TYPE: ICD-10-CM

## 2023-12-11 DIAGNOSIS — R42 DIZZINESS: ICD-10-CM

## 2023-12-11 PROCEDURE — A9577 INJ MULTIHANCE: HCPCS | Performed by: FAMILY MEDICINE

## 2023-12-11 PROCEDURE — 0 GADOBENATE DIMEGLUMINE 529 MG/ML SOLUTION: Performed by: FAMILY MEDICINE

## 2023-12-11 PROCEDURE — 70553 MRI BRAIN STEM W/O & W/DYE: CPT

## 2023-12-11 RX ADMIN — GADOBENATE DIMEGLUMINE 10 ML: 529 INJECTION, SOLUTION INTRAVENOUS at 10:42

## 2023-12-11 NOTE — PROGRESS NOTES
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  I reviewed your stress echocardiogram and it showed you had normal heart function at 61 to 65%.  Normal heart function is typically 50 to 65%.  We saw your heart size was normal and you did not have any significant valvular heart disease.  Additionally we did not see any evidence of a significant heart blockage.  This was overall normal stress test without any concerning cardiac findings.    Thanks,  Stiven

## 2023-12-12 DIAGNOSIS — R42 DIZZINESS: Primary | ICD-10-CM

## 2023-12-12 DIAGNOSIS — R90.89 ABNORMAL BRAIN MRI: ICD-10-CM

## 2023-12-12 LAB
AORTIC ARCH: 2.5 CM
ASCENDING AORTA: 2.1 CM
BH CV ECHO LEFT VENTRICLE GLOBAL LONGITUDINAL STRAIN: -22.6 %
BH CV ECHO MEAS - ACS: 2.1 CM
BH CV ECHO MEAS - AO MAX PG: 10.5 MMHG
BH CV ECHO MEAS - AO MEAN PG: 6 MMHG
BH CV ECHO MEAS - AO ROOT DIAM: 2.7 CM
BH CV ECHO MEAS - AO V2 MAX: 162 CM/SEC
BH CV ECHO MEAS - AO V2 VTI: 33.2 CM
BH CV ECHO MEAS - AVA(I,D): 1.7 CM2
BH CV ECHO MEAS - EDV(CUBED): 103.8 ML
BH CV ECHO MEAS - EDV(MOD-SP4): 93 ML
BH CV ECHO MEAS - EF(MOD-BP): 60 %
BH CV ECHO MEAS - EF(MOD-SP4): 60.2 %
BH CV ECHO MEAS - ESV(CUBED): 29.2 ML
BH CV ECHO MEAS - ESV(MOD-SP4): 37 ML
BH CV ECHO MEAS - FS: 34.5 %
BH CV ECHO MEAS - IVS/LVPW: 0.88 CM
BH CV ECHO MEAS - IVSD: 0.7 CM
BH CV ECHO MEAS - LAT PEAK E' VEL: 18.1 CM/SEC
BH CV ECHO MEAS - LV DIASTOLIC VOL/BSA (35-75): 58 CM2
BH CV ECHO MEAS - LV MASS(C)D: 112.5 GRAMS
BH CV ECHO MEAS - LV MAX PG: 4 MMHG
BH CV ECHO MEAS - LV MEAN PG: 2 MMHG
BH CV ECHO MEAS - LV SYSTOLIC VOL/BSA (12-30): 23.1 CM2
BH CV ECHO MEAS - LV V1 MAX: 100 CM/SEC
BH CV ECHO MEAS - LV V1 VTI: 19.9 CM
BH CV ECHO MEAS - LVIDD: 4.7 CM
BH CV ECHO MEAS - LVIDS: 3.1 CM
BH CV ECHO MEAS - LVOT AREA: 2.8 CM2
BH CV ECHO MEAS - LVOT DIAM: 1.9 CM
BH CV ECHO MEAS - LVPWD: 0.8 CM
BH CV ECHO MEAS - MED PEAK E' VEL: 13.5 CM/SEC
BH CV ECHO MEAS - MR MAX PG: 76.4 MMHG
BH CV ECHO MEAS - MR MAX VEL: 437.1 CM/SEC
BH CV ECHO MEAS - MV A DUR: 0.12 SEC
BH CV ECHO MEAS - MV A MAX VEL: 63.8 CM/SEC
BH CV ECHO MEAS - MV DEC SLOPE: 412.2 CM/SEC2
BH CV ECHO MEAS - MV DEC TIME: 0.2 SEC
BH CV ECHO MEAS - MV E MAX VEL: 104 CM/SEC
BH CV ECHO MEAS - MV E/A: 1.63
BH CV ECHO MEAS - MV MAX PG: 6.1 MMHG
BH CV ECHO MEAS - MV MEAN PG: 2.7 MMHG
BH CV ECHO MEAS - MV P1/2T: 84.9 MSEC
BH CV ECHO MEAS - MV V2 VTI: 34.2 CM
BH CV ECHO MEAS - MVA(P1/2T): 2.6 CM2
BH CV ECHO MEAS - MVA(VTI): 1.65 CM2
BH CV ECHO MEAS - PA ACC TIME: 0.17 SEC
BH CV ECHO MEAS - PA V2 MAX: 137.1 CM/SEC
BH CV ECHO MEAS - PI END-D VEL: 100.4 CM/SEC
BH CV ECHO MEAS - PULM A REVS DUR: 0.13 SEC
BH CV ECHO MEAS - PULM A REVS VEL: 21.9 CM/SEC
BH CV ECHO MEAS - PULM DIAS VEL: 53.1 CM/SEC
BH CV ECHO MEAS - PULM S/D: 0.96
BH CV ECHO MEAS - PULM SYS VEL: 51 CM/SEC
BH CV ECHO MEAS - QP/QS: 1.02
BH CV ECHO MEAS - RAP SYSTOLE: 3 MMHG
BH CV ECHO MEAS - RV MAX PG: 2.7 MMHG
BH CV ECHO MEAS - RV V1 MAX: 81.4 CM/SEC
BH CV ECHO MEAS - RV V1 VTI: 18.1 CM
BH CV ECHO MEAS - RVOT DIAM: 2.02 CM
BH CV ECHO MEAS - RVSP: 27.7 MMHG
BH CV ECHO MEAS - SI(MOD-SP4): 34.9 ML/M2
BH CV ECHO MEAS - SUP REN AO DIAM: 1.3 CM
BH CV ECHO MEAS - SV(LVOT): 56.5 ML
BH CV ECHO MEAS - SV(MOD-SP4): 56 ML
BH CV ECHO MEAS - SV(RVOT): 57.8 ML
BH CV ECHO MEAS - TAPSE (>1.6): 2.9 CM
BH CV ECHO MEAS - TR MAX PG: 24.7 MMHG
BH CV ECHO MEAS - TR MAX VEL: 248.4 CM/SEC
BH CV ECHO MEASUREMENTS AVERAGE E/E' RATIO: 6.58
BH CV STRESS BP STAGE 1: NORMAL
BH CV STRESS BP STAGE 2: NORMAL
BH CV STRESS BP STAGE 3: NORMAL
BH CV STRESS DURATION MIN STAGE 1: 3
BH CV STRESS DURATION MIN STAGE 2: 3
BH CV STRESS DURATION MIN STAGE 3: 3
BH CV STRESS DURATION MIN STAGE 4: 1
BH CV STRESS DURATION SEC STAGE 1: 0
BH CV STRESS DURATION SEC STAGE 2: 0
BH CV STRESS DURATION SEC STAGE 3: 0
BH CV STRESS DURATION SEC STAGE 4: 0
BH CV STRESS ECHO POST STRESS EJECTION FRACTION EF: 72 %
BH CV STRESS GRADE STAGE 1: 10
BH CV STRESS GRADE STAGE 2: 12
BH CV STRESS GRADE STAGE 3: 14
BH CV STRESS GRADE STAGE 4: 16
BH CV STRESS HR STAGE 1: 124
BH CV STRESS HR STAGE 2: 158
BH CV STRESS HR STAGE 3: 179
BH CV STRESS HR STAGE 4: 188
BH CV STRESS METS STAGE 1: 5
BH CV STRESS METS STAGE 2: 7.5
BH CV STRESS METS STAGE 3: 10
BH CV STRESS METS STAGE 4: 11
BH CV STRESS PROTOCOL 1: NORMAL
BH CV STRESS SPEED STAGE 1: 1.7
BH CV STRESS SPEED STAGE 2: 2.5
BH CV STRESS SPEED STAGE 3: 3.4
BH CV STRESS SPEED STAGE 4: 4.2
BH CV STRESS STAGE 1: 1
BH CV STRESS STAGE 2: 2
BH CV STRESS STAGE 3: 3
BH CV STRESS STAGE 4: 4
BH CV XLRA - RV BASE: 2.7 CM
BH CV XLRA - RV LENGTH: 8 CM
BH CV XLRA - RV MID: 2.4 CM
BH CV XLRA - TDI S': 12.4 CM/SEC
LEFT ATRIUM VOLUME INDEX: 17.7 ML/M2
MAXIMAL PREDICTED HEART RATE: 199 BPM
PERCENT MAX PREDICTED HR: 94.47 %
SINUS: 2.3 CM
STJ: 2 CM
STRESS BASELINE BP: NORMAL MMHG
STRESS BASELINE HR: 77 BPM
STRESS PERCENT HR: 111 %
STRESS POST ESTIMATED WORKLOAD: 11 METS
STRESS POST EXERCISE DUR MIN: 10 MIN
STRESS POST EXERCISE DUR SEC: 0 SEC
STRESS POST PEAK BP: NORMAL MMHG
STRESS POST PEAK HR: 188 BPM
STRESS TARGET HR: 169 BPM

## 2023-12-12 NOTE — PROGRESS NOTES
"Reviewed patient MRI, and abnormalitiesnonspecific discussed case with Dr. Gonzales/[facility].  Nonspecific hyperintensity abnormality noted.  Concern for distal vestibular abnormality versus other.  Dr. Gonzales agreeable to evaluate patient, suggested MRA head and neck and referral to vestibular clinic at [facility].    Called patient verified name, and date of birth.  Discussed MRI findings together, answered questions.  Advised patient of plan of care to see neurology, get MRA, and see vestibular clinic.  Patient agreeable.  Patient participated in cycling race recently and after maximal physical exertion experienced another episode of feeling \"out of it \"or \"stone \", like she was easily distracted and could not maintain her focus on maintaining physical exertion.  Symptoms self resolved, she did not become lost or have any syncopal episodes.  Patient denies history of migraine or recurrent headaches.    Maggy Vázquez MD  Family Medicine  "

## 2023-12-13 ENCOUNTER — OFFICE VISIT (OUTPATIENT)
Dept: NEUROLOGY | Facility: CLINIC | Age: 23
End: 2023-12-13
Payer: COMMERCIAL

## 2023-12-13 VITALS
OXYGEN SATURATION: 99 % | DIASTOLIC BLOOD PRESSURE: 64 MMHG | WEIGHT: 119 LBS | HEART RATE: 60 BPM | SYSTOLIC BLOOD PRESSURE: 100 MMHG | BODY MASS INDEX: 19.13 KG/M2 | HEIGHT: 66 IN

## 2023-12-13 DIAGNOSIS — R42 EPISODIC LIGHTHEADEDNESS: Primary | ICD-10-CM

## 2023-12-13 DIAGNOSIS — R41.0 CONFUSION: ICD-10-CM

## 2023-12-13 NOTE — PROGRESS NOTES
"Chief Complaint   Patient presents with    Memory Loss              Patient ID: Matilde Martini is a 23 y.o. female.    HPI: I had the pleasure of seeing your patient today.  As you may know she is a 23-year-old female being seen at the request of and referred to us by Dr. Maggy Vázquez for history of lightheadedness and presyncope.  Patient states that in November she had an episode where she experienced some confusion while riding her bike.  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  She says that after some time she realized that she did not know how she had gotten to that point.  She did feel some lightheadedness as well.  She phoned her mother who came and got her.  The patient says that she felt out of sorts and continued to relax throughout that day.  Since then she had a presyncopal episode.  She says that she was at school and was walking in the hallway.  She began to feel \"tunnel vision\".  As well as some lightheadedness.  She could feel herself \"going down\".  She then leaned up against the wall and slid down.  She says she did not go completely out.  In other words she did not in fact lose consciousness.  However she did have a near syncopal spell at that time.  She does experience lightheadedness more frequently.  It does typically occur when she changes positions.  Also when working out she has a lot of trouble remaining \"focused\".  This is not particularly a visual phenomenon for her.  She does not have room spinning sensations or dizziness.  She does get lightheaded however.  She did recently have an MRI of her brain performed in December.  This showed evidence for minimal T2 flair hyperintensity involving the subcortical white matter of the frontal lobes laterally, bilaterally.  It appears nonspecific.  She has no previous history of severe head trauma however as a cyclist she does encounter accidents frequently.  She states that she typically does not hit her head.  No family history of " similar symptoms.     The following portions of the patient's history were reviewed and updated as appropriate: allergies, current medications, past family history, past medical history, past social history, past surgical history and problem list.    Review of Systems   Neurological:  Positive for dizziness, syncope, weakness and light-headedness. Negative for tremors, seizures, facial asymmetry, speech difficulty, numbness and headaches.   Psychiatric/Behavioral:  Positive for confusion and decreased concentration. Negative for agitation, behavioral problems, dysphoric mood, hallucinations, self-injury, sleep disturbance and suicidal ideas. The patient is not nervous/anxious and is not hyperactive.         I have reviewed the review of systems above performed by my medical assistant.      Vitals:    12/13/23 0839   BP: 100/64   Pulse: 60   SpO2: 99%       Neurologic Exam     Mental Status   Oriented to person, place, and time.   Registration: recalls 3 of 3 objects. Follows 3 step commands.   Attention: normal. Concentration: normal.   Speech: speech is normal   Level of consciousness: alert  Knowledge: consistent with education (No deficits found.).   Normal comprehension.     Cranial Nerves     CN II   Visual fields full to confrontation.     CN III, IV, VI   Pupils are equal, round, and reactive to light.  Extraocular motions are normal.   CN III: no CN III palsy  CN VI: no CN VI palsy  Nystagmus: none   Diplopia: none    CN V   Facial sensation intact.     CN VII   Facial expression full, symmetric.     CN VIII   CN VIII normal.     CN IX, X   CN IX normal.   CN X normal.     CN XI   CN XI normal.     CN XII   CN XII normal.     Motor Exam   Muscle bulk: normal  Right arm tone: normal  Left arm tone: normal  Right leg tone: normal  Left leg tone: normal    Strength   Right neck flexion: 5/5  Left neck flexion: 5/5  Right neck extension: 5/5  Left neck extension: 5/5  Right deltoid: 5/5  Left deltoid: 5/5  Right  biceps: 5/5  Left biceps: 5/5  Right triceps: 5/5  Left triceps: 5/5  Right wrist flexion: 5/5  Left wrist flexion: 5/5  Right wrist extension: 5/5  Left wrist extension: 5/5  Right interossei: 5/5  Left interossei: 5/5  Right abdominals: 5/5  Left abdominals: 5/5  Right iliopsoas: 5/5  Left iliopsoas: 5/5  Right quadriceps: 5/5  Left quadriceps: 5/5  Right hamstrin/5  Left hamstrin/5  Right glutei: 5/5  Left glutei: 5/5  Right anterior tibial: 5/5  Left anterior tibial: 5/5  Right posterior tibial: 5/5  Left posterior tibial: 5/5  Right peroneal: 5/5  Left peroneal: 5/5  Right gastroc: 5/5  Left gastroc: 5/5    Sensory Exam   Light touch normal.   Vibration normal.   Proprioception normal.   Pinprick normal.     Gait, Coordination, and Reflexes     Gait  Gait: normal    Coordination   Romberg: negative    Tremor   Resting tremor: absent  Intention tremor: absent    Reflexes   Right brachioradialis: 2+  Left brachioradialis: 2+  Right biceps: 2+  Left biceps: 2+  Right triceps: 2+  Left triceps: 2+  Right patellar: 2+  Left patellar: 2+  Right achilles: 2+  Left achilles: 2+  Right : 2+  Left : 2+Station is normal.       Physical Exam  Vitals reviewed.   Constitutional:       General: She is not in acute distress.     Appearance: She is well-developed.   HENT:      Head: Normocephalic and atraumatic.   Eyes:      Extraocular Movements: EOM normal.      Pupils: Pupils are equal, round, and reactive to light.   Cardiovascular:      Rate and Rhythm: Normal rate and regular rhythm.      Heart sounds: Normal heart sounds.   Pulmonary:      Effort: Pulmonary effort is normal. No respiratory distress.      Breath sounds: Normal breath sounds.   Abdominal:      General: Bowel sounds are normal. There is no distension.      Palpations: Abdomen is soft.      Tenderness: There is no abdominal tenderness.   Musculoskeletal:         General: No deformity.      Cervical back: Normal range of motion.   Skin:      General: Skin is warm.      Findings: No rash.   Neurological:      Mental Status: She is oriented to person, place, and time.      Coordination: Romberg Test normal.      Gait: Gait is intact.      Deep Tendon Reflexes:      Reflex Scores:       Tricep reflexes are 2+ on the right side and 2+ on the left side.       Bicep reflexes are 2+ on the right side and 2+ on the left side.       Brachioradialis reflexes are 2+ on the right side and 2+ on the left side.       Patellar reflexes are 2+ on the right side and 2+ on the left side.       Achilles reflexes are 2+ on the right side and 2+ on the left side.  Psychiatric:         Speech: Speech normal.         Judgment: Judgment normal.         Procedures    Assessment/Plan: Her symptoms sound very much like POTS syndrome.  She does have a history of lightheadedness with change of position prior to the episode in November.  I do agree with scheduling MRA of the head and neck to evaluate the neck and intracranial vasculature.  I would also add to that scheduling a tilt table test.  We have advised her to continue to drink plenty of water and add some salt to her diet.  May consider midodrine if needed.  We will see her back in a few months to review results.       Diagnoses and all orders for this visit:    1. Episodic lightheadedness (Primary)  -     Tilt Table; Future    2. Confusion           Marcos Gonzales II, MD

## 2023-12-19 ENCOUNTER — PATIENT ROUNDING (BHMG ONLY) (OUTPATIENT)
Dept: NEUROLOGY | Facility: CLINIC | Age: 23
End: 2023-12-19
Payer: COMMERCIAL

## 2023-12-28 DIAGNOSIS — F40.240 CLAUSTROPHOBIA: Primary | ICD-10-CM

## 2023-12-28 RX ORDER — LORAZEPAM 1 MG/1
1 TABLET ORAL TAKE AS DIRECTED
Qty: 4 TABLET | Refills: 0 | Status: SHIPPED | OUTPATIENT
Start: 2023-12-28

## 2023-12-29 ENCOUNTER — HOSPITAL ENCOUNTER (OUTPATIENT)
Dept: MRI IMAGING | Facility: HOSPITAL | Age: 23
Discharge: HOME | End: 2023-12-29
Payer: COMMERCIAL

## 2023-12-29 ENCOUNTER — TELEPHONE (OUTPATIENT)
Dept: FAMILY MEDICINE CLINIC | Facility: CLINIC | Age: 23
End: 2023-12-29
Payer: COMMERCIAL

## 2023-12-29 DIAGNOSIS — R42 DIZZINESS: ICD-10-CM

## 2023-12-29 DIAGNOSIS — R90.89 ABNORMAL BRAIN MRI: ICD-10-CM

## 2023-12-29 DIAGNOSIS — R55 SYNCOPE, UNSPECIFIED SYNCOPE TYPE: Primary | ICD-10-CM

## 2023-12-29 DIAGNOSIS — R55 SYNCOPE, UNSPECIFIED SYNCOPE TYPE: ICD-10-CM

## 2023-12-29 PROCEDURE — A9577 INJ MULTIHANCE: HCPCS | Performed by: FAMILY MEDICINE

## 2023-12-29 PROCEDURE — 70549 MR ANGIOGRAPH NECK W/O&W/DYE: CPT

## 2023-12-29 PROCEDURE — 70544 MR ANGIOGRAPHY HEAD W/O DYE: CPT

## 2023-12-29 PROCEDURE — 0 GADOBENATE DIMEGLUMINE 529 MG/ML SOLUTION: Performed by: FAMILY MEDICINE

## 2023-12-29 RX ADMIN — GADOBENATE DIMEGLUMINE 11 ML: 529 INJECTION, SOLUTION INTRAVENOUS at 20:10

## 2023-12-29 NOTE — TELEPHONE ENCOUNTER
Radiology called stated that the MRI of the head is ordered wrong it should be without contrast instead of with and without contrast. Appt. Is later today     PLEASE ADVISE

## 2024-01-02 ENCOUNTER — HOSPITAL ENCOUNTER (OUTPATIENT)
Dept: CARDIOLOGY | Facility: HOSPITAL | Age: 24
Discharge: HOME | End: 2024-01-02
Admitting: INTERNAL MEDICINE
Payer: COMMERCIAL

## 2024-01-02 VITALS
TEMPERATURE: 98.2 F | WEIGHT: 119 LBS | OXYGEN SATURATION: 100 % | SYSTOLIC BLOOD PRESSURE: 113 MMHG | DIASTOLIC BLOOD PRESSURE: 69 MMHG | HEART RATE: 69 BPM | RESPIRATION RATE: 16 BRPM | BODY MASS INDEX: 19.13 KG/M2 | HEIGHT: 66 IN

## 2024-01-02 DIAGNOSIS — R42 EPISODIC LIGHTHEADEDNESS: ICD-10-CM

## 2024-01-02 LAB
B-HCG UR QL: NEGATIVE
EXPIRATION DATE: NORMAL
INTERNAL NEGATIVE CONTROL: NORMAL
INTERNAL POSITIVE CONTROL: NORMAL
Lab: NORMAL

## 2024-01-02 PROCEDURE — 81025 URINE PREGNANCY TEST: CPT | Performed by: INTERNAL MEDICINE

## 2024-01-02 PROCEDURE — 93660 TILT TABLE EVALUATION: CPT

## 2024-01-02 RX ORDER — NITROGLYCERIN 0.4 MG/1
0.4 TABLET SUBLINGUAL
Status: DISCONTINUED | OUTPATIENT
Start: 2024-01-02 | End: 2024-01-03 | Stop reason: HOSPADM

## 2024-01-02 RX ORDER — SODIUM CHLORIDE 0.9 % (FLUSH) 0.9 %
10 SYRINGE (ML) INJECTION AS NEEDED
Status: DISCONTINUED | OUTPATIENT
Start: 2024-01-02 | End: 2024-01-03 | Stop reason: HOSPADM

## 2024-01-02 RX ORDER — SODIUM CHLORIDE 9 MG/ML
75 INJECTION, SOLUTION INTRAVENOUS CONTINUOUS
Status: DISCONTINUED | OUTPATIENT
Start: 2024-01-02 | End: 2024-01-03 | Stop reason: HOSPADM

## 2024-01-02 RX ADMIN — SODIUM CHLORIDE 75 ML/HR: 9 INJECTION, SOLUTION INTRAVENOUS at 09:25

## 2024-01-02 NOTE — PERIOPERATIVE NURSING NOTE
Dr Vegas reviewed tilt table flowsheet and monitor strips, then discussed with patient her symptoms/duration. He recommended treatment options related to hydration, change in her exercise regime, plus follow up for potential sleep apnea being cause for her fatigue/exhaustion symptoms since September, 2023. Patient expressed frustration that despite multiple lab & diagnostic tests being done she doesn't know why she feels so tired/exhausted and is an otherwise healthy/active young person.

## 2024-01-02 NOTE — PROGRESS NOTES
Hello!    I have reviewed your tilt table test results, which were positive.I recommend followup with cardiology to discuss management.     If you have any questions, please let us know.   Thank you!  Dr. Vázquez

## 2024-01-08 NOTE — PROGRESS NOTES
Date of Office Visit: 01/10/24  Encounter Provider: Antonio Bishop MD  Place of Service: [facility] CARDIOLOGY  Patient Name: Matilde Martini  :2000    Chief Complaint   Patient presents with    Loss of Consciousness   :     HPI:     Ms. Martini is 24 y.o. and presents today for a second opinion.    She is a previously healthy young woman.  She has had no major chronic illnesses.  She has had no unexpected childhood or young adulthood illnesses.  She has no worrisome family history.  She is a professional road bicyclist, and is used to cycling very long distances, at multiple elevations, and several climbs and distance.    Over the last year she has had increasing problems with lightheadedness and near syncope while standing or after riding her bike.  She did pass out once after a strenuous ride.  She has not had any drop attacks.  She can often abort an episode by sitting down or lying down.  She has had worsening brain fog and has had trouble with word finding and concentration.  She has not been able to join her team on rides, which is affecting her emotionally.  She has not had palpitations or chest pain.  She has not had leg swelling.  She has been wearing compression socks that she wore when she previously ran cross-country.  She drinks a very large amount of water every day and present an electrolyte supplement.  She adds extra salt to her meals when eating.    Because of her symptoms, a stress echocardiogram was performed recently.  I reviewed it, and her baseline echo and her stress echo and stress EKG were all normal.  She then had a tilt table test performed.  She developed her prodromal symptoms and briefly lost consciousness.  She had low normal blood pressures at baseline and her systolic pressure did drop To 85/44 mmHg.  Her heart rate went from the 80s to the 90s before the syncopal event, and then to the 60s.    She remains highly symptomatic and her quality of  life is being affected.    Of note, she can not identify any major changes or viral illnesses that preceded the onset of symptoms. She did receive her first COVID series in early 2021 but has not had further vaccines/boosters.    Past Medical History:   Diagnosis Date    Asthma     In middle and high school    Mild sprain of left ankle        History reviewed. No pertinent surgical history.    Social History     Socioeconomic History    Marital status: Single   Tobacco Use    Smoking status: Never     Passive exposure: Never    Smokeless tobacco: Never   Vaping Use    Vaping Use: Never used   Substance and Sexual Activity    Alcohol use: Yes     Comment: PATIENT REPORTS MAYBE ONCE A MONTH    Drug use: Never    Sexual activity: Yes     Partners: Male     Birth control/protection: Condom       Family History   Problem Relation Age of Onset    No Known Problems Mother     No Known Problems Father     No Known Problems Sister     No Known Problems Brother     No Known Problems Maternal Aunt     No Known Problems Maternal Uncle     No Known Problems Paternal Aunt     No Known Problems Paternal Uncle     No Known Problems Maternal Grandmother     No Known Problems Maternal Grandfather     No Known Problems Paternal Grandmother     No Known Problems Paternal Grandfather     No Known Problems Other        Review of Systems   Constitutional: Positive for malaise/fatigue.   Cardiovascular:  Positive for near-syncope.   Neurological:  Positive for difficulty with concentration, excessive daytime sleepiness and light-headedness.       No Known Allergies      Current Outpatient Medications:     LORazepam (Ativan) 1 MG tablet, Take 1 tablet by mouth Take As Directed. Take 1 tab by mouth 60 min before imaging appt, may repeat after 1 hour if needed for claustrophobia. (Patient not taking: Reported on 1/10/2024), Disp: 4 tablet, Rfl: 0      Objective:     Vitals:    01/10/24 1034 01/10/24 1038   BP: 98/70 90/70   BP Location: Left  "arm Right arm   Pulse:  63   Weight: 54.9 kg (121 lb)    Height: 167.6 cm (66\")      Body mass index is 19.53 kg/m².    Vitals reviewed.   Constitutional:       Appearance: Well-developed and not in distress.   Eyes:      Conjunctiva/sclera: Conjunctivae normal.   HENT:      Head: Normocephalic.      Nose: Nose normal.   Neck:      Thyroid: Thyroid normal.      Vascular: No JVD. JVD normal.      Lymphadenopathy: No cervical adenopathy.   Pulmonary:      Effort: Pulmonary effort is normal.      Breath sounds: Normal breath sounds.   Cardiovascular:      Normal rate. Regular rhythm.      Murmurs: There is no murmur.   Pulses:     Intact distal pulses.   Edema:     Peripheral edema absent.   Abdominal:      Palpations: Abdomen is soft.      Tenderness: There is no abdominal tenderness.   Musculoskeletal: Normal range of motion.      Cervical back: Normal range of motion. Skin:     General: Skin is warm and dry.   Neurological:      General: No focal deficit present.      Mental Status: Alert and oriented to person, place, and time.      Cranial Nerves: No cranial nerve deficit.   Psychiatric:         Attention and Perception: Attention normal.         Speech: Speech normal.         Behavior: Behavior normal.         Thought Content: Thought content normal.         Judgment: Judgment normal.      Comments: A bit tearful at times, appropriately           ECG 12 Lead    Date/Time: 1/10/2024 12:19 PM  Performed by: Antonio Bishop MD    Authorized by: Antonio Bishop MD  Comparison: compared with previous ECG   Similar to previous ECG  Rhythm: sinus rhythm  Conduction: conduction normal  ST Segments: ST segments normal  T Waves: T waves normal  QRS axis: normal  Other: no other findings    Clinical impression: normal ECG            Assessment:       Diagnosis Plan   1. Autonomic orthostatic hypotension  Basic Metabolic Panel    Catecholamines, Fractionated, Plasma    ECG 12 Lead      2. Syncope, unspecified syncope type  " Basic Metabolic Panel    Catecholamines, Fractionated, Plasma    ECG 12 Lead      3. 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 She has no chronic medical conditions. She is suffering from symptomatic orthostasis, with what likely has a vasovagal component. I suspect that orthostasis is the driving factor, though. She doesn't have POTS, per se, as she's not tachycardic.  Regardless, her condition seems consistent with some form of dysautonomia, possibly primary OH. She has a structurally normal heart and has already had a normal stress EKG/echo.    I do worry that her endurance cycling history makes her struggle with this even more as she likely has an extensive amount of venous pooling in her legs, as many cyclist do.  Unfortunately, this increases her chances of having symptomatic orthostasis.     We discussed the general management of dysautonomia and orthostatic hypotension.  We discussed the fact that her heart is actually not the cause, and that she has a healthy heart.  We discussed that it is a frustrating and chronic condition, which we wish to manage with lifestyle changes and sometimes with medications.  I have asked her to buy 20-30 mm compression stockings that are knee-high to start.  We may ultimately have to even switch to thigh-high stockings.  She is to continue drinking large amounts of water, electrolytes, and adding sodium to her food.  Ongoing exercise is encouraged, although gentler forms of exercise such as swimming or yoga/Pilates may be more tolerable.    I think it is reasonable to start a low-dose of a volume expander.  I prescribed 0.1 mg of fludrocortisone, which we will start daily and then titrate upwards as needed/as tolerated.  She will need a BMP in a week to assess her sodium  and potassium levels before dose titration.  I will also order fractionated catecholamines to check her norepinephrine level.  Atomoxetine is sometimes helpful with this condition, and considering she is also experiencing brain fog symptoms, it may accomplish two tasks.  Lastly, an SNRI may be helpful.  It had its own beneficial effects with norepinephrine regulation and with the symptoms of OH, but I also worry that this drastic change in her lifestyle is leading to an adjustment disorder (which would not be unexpected).    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    Sincerely,       Antonio Bishop MD

## 2024-01-10 ENCOUNTER — OFFICE VISIT (OUTPATIENT)
Dept: CARDIOLOGY | Facility: CLINIC | Age: 24
End: 2024-01-10
Payer: COMMERCIAL

## 2024-01-10 VITALS
HEIGHT: 66 IN | WEIGHT: 121 LBS | BODY MASS INDEX: 19.44 KG/M2 | HEART RATE: 63 BPM | SYSTOLIC BLOOD PRESSURE: 90 MMHG | DIASTOLIC BLOOD PRESSURE: 70 MMHG

## 2024-01-10 DIAGNOSIS — I95.1 AUTONOMIC ORTHOSTATIC HYPOTENSION: Primary | ICD-10-CM

## 2024-01-10 DIAGNOSIS — R55 SYNCOPE, UNSPECIFIED SYNCOPE TYPE: ICD-10-CM

## 2024-01-10 DIAGNOSIS — R41.89 BRAIN FOG: ICD-10-CM

## 2024-01-10 RX ORDER — FLUDROCORTISONE ACETATE 0.1 MG/1
TABLET ORAL
Qty: 60 TABLET | Refills: 3 | Status: SHIPPED | OUTPATIENT
Start: 2024-01-10

## 2024-01-10 NOTE — Clinical Note
Let's see what her labs show -- I'll take care of the fludro. But Strattera/Effexor may be good adjuncts if she's open to trying them too.  jdk

## 2024-01-11 ENCOUNTER — PATIENT MESSAGE (OUTPATIENT)
Dept: CARDIOLOGY | Facility: CLINIC | Age: 24
End: 2024-01-11
Payer: COMMERCIAL

## 2024-01-26 RX ORDER — MIDODRINE HYDROCHLORIDE 5 MG/1
5 TABLET ORAL
Qty: 90 TABLET | Refills: 2 | Status: SHIPPED | OUTPATIENT
Start: 2024-01-26

## 2024-01-26 NOTE — TELEPHONE ENCOUNTER
From: Matilde Martini  To: Antonio Bishop  Sent: 1/11/2024 3:07 PM EST  Subject: Follow up questions    -Is there a difference in how fludrocortisone vs Midodrine would work for me and why fludrocortisone was picked? Just curiosity.    -should I get the knee high or thigh high compression socks and when/how long should I wear them?    -The other drugs that were mentioned to discuss with my primary care, would those be instead of the originally prescribed drug or in addition to?    Sorry for all the questions, I am endlessly curious.

## 2024-03-16 ENCOUNTER — APPOINTMENT (OUTPATIENT)
Dept: GENERAL RADIOLOGY | Facility: HOSPITAL | Age: 24
End: 2024-03-16
Payer: COMMERCIAL

## 2024-03-16 ENCOUNTER — HOSPITAL ENCOUNTER (EMERGENCY)
Facility: HOSPITAL | Age: 24
Discharge: HOME | End: 2024-03-17
Attending: STUDENT IN AN ORGANIZED HEALTH CARE EDUCATION/TRAINING PROGRAM | Admitting: STUDENT IN AN ORGANIZED HEALTH CARE EDUCATION/TRAINING PROGRAM
Payer: COMMERCIAL

## 2024-03-16 VITALS
HEIGHT: 66 IN | DIASTOLIC BLOOD PRESSURE: 88 MMHG | TEMPERATURE: 97.8 F | BODY MASS INDEX: 18.64 KG/M2 | WEIGHT: 116 LBS | OXYGEN SATURATION: 98 % | SYSTOLIC BLOOD PRESSURE: 126 MMHG | HEART RATE: 60 BPM | RESPIRATION RATE: 16 BRPM

## 2024-03-16 DIAGNOSIS — S90.31XA CONTUSION OF RIGHT FOOT, INITIAL ENCOUNTER: Primary | ICD-10-CM

## 2024-03-16 PROCEDURE — 73630 X-RAY EXAM OF FOOT: CPT

## 2024-03-16 PROCEDURE — 99283 EMERGENCY DEPT VISIT LOW MDM: CPT

## 2024-03-17 PROCEDURE — 99283 EMERGENCY DEPT VISIT LOW MDM: CPT | Performed by: STUDENT IN AN ORGANIZED HEALTH CARE EDUCATION/TRAINING PROGRAM

## 2024-03-17 NOTE — FSED PROVIDER NOTE
Subjective   History of Present Illness  Pt is a 24 y.o. female with PMH as listed who presents for   Chief Complaint   Patient presents with    Foot Injury       Patient is a 24-year-old female presents for right foot injury.  States that she was in a mall when she tripped and fell down some stairs hitting her right foot.  She immediately had pain to the lateral aspect of her right foot and was able to put weight on the lateral aspect of her foot.  She was able to ambulate however using the medial aspect of her foot.  Denies hitting her head or LOC.  Has no other complaints, no other injuries.    Review of Systems    Past Medical History:   Diagnosis Date    Asthma     In middle and high school    Mild sprain of left ankle        No Known Allergies    History reviewed. No pertinent surgical history.    Family History   Problem Relation Age of Onset    No Known Problems Mother     No Known Problems Father     No Known Problems Sister     No Known Problems Brother     No Known Problems Maternal Aunt     No Known Problems Maternal Uncle     No Known Problems Paternal Aunt     No Known Problems Paternal Uncle     No Known Problems Maternal Grandmother     No Known Problems Maternal Grandfather     No Known Problems Paternal Grandmother     No Known Problems Paternal Grandfather     No Known Problems Other        Social History     Socioeconomic History    Marital status: Single   Tobacco Use    Smoking status: Never     Passive exposure: Never    Smokeless tobacco: Never   Vaping Use    Vaping status: Never Used   Substance and Sexual Activity    Alcohol use: Yes     Comment: PATIENT REPORTS MAYBE ONCE A MONTH    Drug use: Never    Sexual activity: Yes     Partners: Male     Birth control/protection: Condom           Objective   Physical Exam  Constitutional:       Appearance: Normal appearance.   HENT:      Head: Normocephalic and atraumatic.      Mouth/Throat:      Mouth: Mucous membranes are moist.      Pharynx:  Oropharynx is clear.   Eyes:      Conjunctiva/sclera: Conjunctivae normal.   Cardiovascular:      Rate and Rhythm: Normal rate.   Pulmonary:      Effort: Pulmonary effort is normal.   Abdominal:      General: Abdomen is flat.   Musculoskeletal:      Cervical back: Neck supple.      Comments: Mildly tender to palpation over the dorsum and lateral aspect of right foot, no swelling, ecchymoses, ROM intact, NVM intact.   Skin:     General: Skin is warm and dry.   Neurological:      Mental Status: She is alert.   Psychiatric:         Mood and Affect: Mood normal.         Procedures           ED Course                                           Medical Decision Making  My diagnosis for lower extremity pain and injury includes but is not limited to hip fracture, femur fracture, hip dislocation, hip contusion, hip sprain, hip strain, pelvic fracture, ischio-tibial band pain, ischio-tibial band bursitis, knee sprain, patella dislocation, knee dislocation, internal derangement of knee, fractures of the femur, tibia, fibula, ankle, foot and digits, ankle sprain, ankle dislocation, Lisfranc fracture, fracture dislocations of the digits, pulmonary embolism, claudication, peripheral vascular disease, gout, osteoarthritis, rheumatoid arthritis, bursitis, septic joint, poly-rheumatica, polyarthralgia and other inflammatory or infectious disease processes.      Problems Addressed:  Contusion of right foot, initial encounter: complicated acute illness or injury    Amount and/or Complexity of Data Reviewed  Radiology: ordered.     Details: X-ray negative for acute fracture dislocation based on my interpretation.    Patient presents for right foot injury, exam is significant for tenderness over the lateral and dorsum of the right foot.  X-rays negative for any acute findings.  Offered Tylenol or Motrin, she declined.  Discussed with her results of x-ray and plan for discharge with PCP follow-up.  She understands and agrees with plan of  care.  All questions answered.    Final diagnoses:   Contusion of right foot, initial encounter       ED Disposition  ED Disposition       ED Disposition   Discharge    Condition   Stable    Comment   --               Maggy Vázquez MD  [id number] [id number] [phone]    Schedule an appointment as soon as possible for a visit in 3 days  For re-evaluation         Medication List      No changes were made to your prescriptions during this visit.

## 2024-09-06 DIAGNOSIS — I95.1 ORTHOSTATIC HYPOTENSION: Primary | ICD-10-CM

## 2024-09-06 RX ORDER — ATOMOXETINE 18 MG/1
18 CAPSULE ORAL DAILY
Qty: 45 CAPSULE | Refills: 0 | Status: SHIPPED | OUTPATIENT
Start: 2024-09-06

## 2025-01-10 ENCOUNTER — TELEPHONE (OUTPATIENT)
Dept: CARDIOLOGY | Facility: CLINIC | Age: 25
End: 2025-01-10
Payer: COMMERCIAL

## 2025-01-10 NOTE — TELEPHONE ENCOUNTER
If patient is still taking midodrine under our prescription, she needs a follow-up appointment with me or Dr. Bishop in the office.  Please arrange.  Thank you